# Patient Record
Sex: MALE | Race: ASIAN | Employment: FULL TIME | URBAN - METROPOLITAN AREA
[De-identification: names, ages, dates, MRNs, and addresses within clinical notes are randomized per-mention and may not be internally consistent; named-entity substitution may affect disease eponyms.]

---

## 2021-09-28 ENCOUNTER — HOSPITAL ENCOUNTER (OUTPATIENT)
Age: 46
Discharge: HOME | End: 2021-09-28
Attending: EMERGENCY MEDICINE
Payer: COMMERCIAL

## 2021-09-28 ENCOUNTER — APPOINTMENT (OUTPATIENT)
Dept: GENERAL RADIOLOGY | Age: 46
End: 2021-09-28
Attending: EMERGENCY MEDICINE
Payer: COMMERCIAL

## 2021-09-28 VITALS
SYSTOLIC BLOOD PRESSURE: 120 MMHG | TEMPERATURE: 97 F | WEIGHT: 185 LBS | RESPIRATION RATE: 18 BRPM | HEART RATE: 62 BPM | DIASTOLIC BLOOD PRESSURE: 67 MMHG | OXYGEN SATURATION: 97 %

## 2021-09-28 DIAGNOSIS — M51.36 DDD (DEGENERATIVE DISC DISEASE), LUMBAR: ICD-10-CM

## 2021-09-28 DIAGNOSIS — M54.32 SCIATICA OF LEFT SIDE: Primary | ICD-10-CM

## 2021-09-28 PROCEDURE — 72110 X-RAY EXAM L-2 SPINE 4/>VWS: CPT | Performed by: EMERGENCY MEDICINE

## 2021-09-28 PROCEDURE — 99205 OFFICE O/P NEW HI 60 MIN: CPT

## 2021-09-28 PROCEDURE — 99203 OFFICE O/P NEW LOW 30 MIN: CPT

## 2021-09-28 RX ORDER — METHYLPREDNISOLONE 4 MG/1
TABLET ORAL
Qty: 1 EACH | Refills: 0 | Status: SHIPPED | OUTPATIENT
Start: 2021-09-28

## 2021-09-28 RX ORDER — CYCLOBENZAPRINE HCL 10 MG
10 TABLET ORAL 3 TIMES DAILY PRN
Qty: 20 TABLET | Refills: 0 | Status: SHIPPED | OUTPATIENT
Start: 2021-09-28 | End: 2021-10-05

## 2021-09-28 RX ORDER — ACETAMINOPHEN AND CODEINE PHOSPHATE 300; 30 MG/1; MG/1
1-2 TABLET ORAL EVERY 6 HOURS PRN
Qty: 10 TABLET | Refills: 0 | Status: SHIPPED | OUTPATIENT
Start: 2021-09-28 | End: 2021-10-05

## 2021-09-28 RX ORDER — NABUMETONE 750 MG/1
750 TABLET, FILM COATED ORAL 2 TIMES DAILY
Qty: 60 TABLET | Refills: 1 | Status: SHIPPED | OUTPATIENT
Start: 2021-09-28 | End: 2021-10-28

## 2021-09-28 RX ORDER — HYDROCODONE BITARTRATE AND ACETAMINOPHEN 5; 325 MG/1; MG/1
1 TABLET ORAL ONCE
Status: COMPLETED | OUTPATIENT
Start: 2021-09-28 | End: 2021-09-28

## 2021-09-28 RX ORDER — PREDNISONE 20 MG/1
40 TABLET ORAL ONCE
Status: COMPLETED | OUTPATIENT
Start: 2021-09-28 | End: 2021-09-28

## 2021-09-28 NOTE — ED PROVIDER NOTES
Mary ren  Patient Seen in: [facility]      History   Patient presents with:  Back Pain    Stated Complaint: back pain    Subjective:   HPI    Pain over left low back, over the sciatic foramen.   Pleasant 55year-old presents to the emergen Pulmonary:      Effort: Pulmonary effort is normal.      Breath sounds: Normal breath sounds. Abdominal:      General: Abdomen is flat. Bowel sounds are normal.      Palpations: Abdomen is soft.    Musculoskeletal:         General: No swelling or deform presents with his wife with concern that he is having left low back discomfort particularly with movement making difficult to sleep the last 2-3 nights finally came in for treatment and evaluation.   May represent a true sciatica with pain with palpation of

## 2023-12-26 ENCOUNTER — TELEPHONE (OUTPATIENT)
Dept: INTERNAL MEDICINE CLINIC | Facility: CLINIC | Age: 48
End: 2023-12-26

## 2024-05-20 ENCOUNTER — TELEPHONE (OUTPATIENT)
Dept: INTERNAL MEDICINE CLINIC | Facility: CLINIC | Age: 49
End: 2024-05-20

## 2024-05-20 NOTE — TELEPHONE ENCOUNTER
Talisha pharmacist from [facility] calling states they received a printed prescription from Patient for Norco and Adderall. Pharmacist calling to verify prescription.  Verified Patient's name and date of birth.  Patient is not a Patient of Dr. Isac Contreras. No prior office visit.  Per Talisha, Patient gave her permission to contact Dr. Isac Contreras's office.  Informed Talisha, prescriptions were not from our providers.    Chart reviewed. Noted outside medication records. Patient has filled medications from previous pharmacies with Dr. Contreras as prescribing provider.      Pharmacy will fax copy of prescription to Dr. Isac Contreras. Provided [facility] fax number.  Routed to .

## 2024-11-15 ENCOUNTER — TELEPHONE (OUTPATIENT)
Dept: INTERNAL MEDICINE CLINIC | Facility: CLINIC | Age: 49
End: 2024-11-15

## 2024-11-15 NOTE — TELEPHONE ENCOUNTER
Pharmacy called,Shirley called from [facility]   stated Pt brought in a Written  for #120  [place] 10, #60 Adderal from Dr Contreras   Advised  do not dispense written prescriptions, verbalized understanding

## 2025-01-02 ENCOUNTER — TELEPHONE (OUTPATIENT)
Dept: INTERNAL MEDICINE CLINIC | Facility: CLINIC | Age: 50
End: 2025-01-02

## 2025-01-02 NOTE — TELEPHONE ENCOUNTER
Received call form [facility] in [place] asking if this patient had a hand written script for norco from Dr. Contreras, confirmed he did not. Per chart review, this has happened several times with this patient. Dr. Contreras please advise just a heads up.

## 2025-01-06 NOTE — TELEPHONE ENCOUNTER
Notified Aaron at [facility]'s pharmacy that our office does not give out paper scripts for controlled substances,

## 2025-02-04 ENCOUNTER — TELEPHONE (OUTPATIENT)
Dept: INTERNAL MEDICINE CLINIC | Facility: CLINIC | Age: 50
End: 2025-02-04

## 2025-02-04 NOTE — TELEPHONE ENCOUNTER
Advised Apryl, pharmacy technician of Dr. Contreras's note. Apryl verbalized understanding.   Stated they did look up that patient filled norco at [facility] on 12/27/2024 and 2/6/2025 at [facility] for Adderall and Norco under Dr Isac Contreras. Advised that none of those scripts are from Dr Isac Contreras.       Left message on patient's number to call back-transfer to triage. Please verify if patient dropped off any scripts recently.

## 2025-02-04 NOTE — TELEPHONE ENCOUNTER
Patient called back and states he never requested any medication with a paper script this was not him Patient states this happened to him before and does not know who is doing this. Patient states he is not even a patient of .

## 2025-02-04 NOTE — TELEPHONE ENCOUNTER
Please inform pharmacy that I will not give paper prescription for narcotics for any patients.  If they get any prescriptions, they all are fraud.    Sarai,  Please discussed with Lexa and see whether we need to do anything here.  Thank you

## 2025-02-04 NOTE — TELEPHONE ENCOUNTER
Apryl/maria with [facility] called, she wants to confirm Rx for [place] dated 1/29/25 - she was made aware we did not give an Rx for Norco from what is seen in his chart. She was made aware of a similar incident on 1/2/25 where we received a call from [facility]. She states they called as there were many red-flags noticed, paper Rx, not able to provide identification - person told pharmacy that was picking up Rx did not come back. She will fax over a copy of paper Rx that they have for Dr. Contreras to review it and escalate if needed.  I made her aware I will convey the above to Dr. Contreras. She verbalized understanding. No further questions or concerns at this time.    FYI Dr. Contreras, advise if any

## 2025-02-05 NOTE — TELEPHONE ENCOUNTER
Please inform those pharmacies as well.  We will not give paper prescriptions and they should not be refilling any of those paper prescriptions under my name.  Thank you

## 2025-02-05 NOTE — TELEPHONE ENCOUNTER
Spoke to the pharmacist at [facility], verified patient's Name and . Confirmed this is [facility] in [place] and [place] in [place].     Spoke to Rubi, pharmacist - [facility], verified patient's Name and . States all [facility] is aware of this situation now.    Spoke to Rosy, pharmacist - [place], verified patient's Name and . Made aware of provider's message below. Pharmacist acknowledged and noted this.    Dr. Isac CERVANTES.